# Patient Record
Sex: MALE | URBAN - METROPOLITAN AREA
[De-identification: names, ages, dates, MRNs, and addresses within clinical notes are randomized per-mention and may not be internally consistent; named-entity substitution may affect disease eponyms.]

---

## 2022-08-18 ENCOUNTER — OFFICE VISIT (OUTPATIENT)
Dept: ENT CLINIC | Age: 62
End: 2022-08-18
Payer: COMMERCIAL

## 2022-08-18 VITALS — SYSTOLIC BLOOD PRESSURE: 149 MMHG | DIASTOLIC BLOOD PRESSURE: 100 MMHG | WEIGHT: 201 LBS | HEART RATE: 76 BPM

## 2022-08-18 DIAGNOSIS — H91.93 BILATERAL HEARING LOSS, UNSPECIFIED HEARING LOSS TYPE: Chronic | ICD-10-CM

## 2022-08-18 DIAGNOSIS — S00.33XA CONTUSION OF NOSE, INITIAL ENCOUNTER: Primary | ICD-10-CM

## 2022-08-18 DIAGNOSIS — H93.13 SUBJECTIVE TINNITUS OF BOTH EARS: Chronic | ICD-10-CM

## 2022-08-18 DIAGNOSIS — J30.2 SEASONAL ALLERGIC RHINITIS, UNSPECIFIED TRIGGER: Chronic | ICD-10-CM

## 2022-08-18 PROCEDURE — 99203 OFFICE O/P NEW LOW 30 MIN: CPT | Performed by: OTOLARYNGOLOGY

## 2022-08-18 ASSESSMENT — ENCOUNTER SYMPTOMS
SINUS PAIN: 0
RHINORRHEA: 0
SORE THROAT: 0

## 2022-08-18 NOTE — PROGRESS NOTES
[facility]       NEW PATIENT VISIT      PCP:  MAURICE Van MD      REFERRED BY:   self      CHIEF COMPLAINT  Chief Complaint   Patient presents with    Facial Injury     Nasal injuery last week - C/O pain nose and forehead . Also C/O tinnitus and would like to inquire about getting a hearing eval        HISTORY OF PRESENT ILLNESS       Gurpreet Evans is a 63 y.o. male who presented today for evaluation and management for a nasal injury. \"I collided with a swimmer\" last week 8 days ago, bloody nose while reneqing  certification to practice   No change in shape or nose. Dull headache middle forehead and between the eyes nasal dorsum, since the injury. No difficulty breathing through nose. \"Ringing in my ears\", both ears, for 5-6 years, constant. \"I sleep with a fan, it does drown out the noise. \"  \"My kids tell me I have the TV up loud. \"  Denied any past sinus infection. REVIEW OF SYSTEMS   Review of Systems   Constitutional:  Negative for chills and fever. HENT:  Positive for hearing loss and tinnitus. Negative for ear discharge, ear pain, rhinorrhea, sinus pain and sore throat. PAST MEDICAL HISTORY    History reviewed. No pertinent past medical history. History reviewed. No pertinent surgical history. EXAMINATION    Vitals:    08/18/22 0814   BP: (!) 149/100   Pulse: 76   Weight: 201 lb (91.2 kg)     General:  WDWN, NAD, alert and oriented  Face: There was no swelling or lesions detected. No gross deformity of the facial skeleton, including the zygoma. No step off deformity of the orbital rims. No evidence of nasal, [place], malar-maxillary, orbital, zygomatic, and mandibular fracture. Medial canthal ligaments intact. No diplopia. No black eyes. Voice: Normal with no hoarseness or hot potato voice.     Ears:  TMs and EACs appeared to be normal.    (+) Nose:  Dorsum mildly deflected to the left in the cartilaginous mobile area of nose. Bony dorsum midline and straight. The nasal septum was moderately deviated to the right with deflection of the caudal margin to the left. The inferior nasal turbinates, secretions, and mucosa appeared to be normal.   Sinuses:  Maxillary and frontal sinuses were nontender to palpation and percussion. Oral cavity:  Mucosa, secretions, tongue, and gingiva appeared to be normal.   Oropharynx:  The palatine tonsils, hard and soft palates, uvula, tongue, posterior oropharyngeal wall, mucosa and secretions appeared to be normal.     Salivary Glands:  Normal bilateral parotid and bilateral submandibular salivary glands. Neck:  No masses or tenderness. Trachea midline. Laryngeal cartilages and hyoid bone normal.  Normal laryngeal crepitus. Lymph nodes:  No cervical lymphadenopathy. Britt Sullivan / Rosa Coleman / Minnie Apgar was seen today for facial injury. Diagnoses and all orders for this visit:    Contusion of nose, initial encounter    Subjective tinnitus of both ears  -     Deann Roberts [place], Audiology, [facility]    Bilateral hearing loss, unspecified hearing loss type  -     [address] [place], [place], Audiology, [facility]    Seasonal allergic rhinitis, unspecified trigger       RECOMMENDATIONS/PLAN      Over the counter allergy medications as needed. RTO if not effective. Septal reconstruction, proceed if feels need to improve nasal airway/breathing ability. Acetaminophen (eg. Tylenol) or Ibuprofen (eg. Advil) (over the counter medications) as needed for pain.   Return for recheck/follow-up after hearing test.

## 2022-09-19 ENCOUNTER — PROCEDURE VISIT (OUTPATIENT)
Dept: AUDIOLOGY | Age: 62
End: 2022-09-19
Payer: COMMERCIAL

## 2022-09-19 ENCOUNTER — OFFICE VISIT (OUTPATIENT)
Dept: ENT CLINIC | Age: 62
End: 2022-09-19
Payer: COMMERCIAL

## 2022-09-19 VITALS
DIASTOLIC BLOOD PRESSURE: 94 MMHG | HEIGHT: 69 IN | OXYGEN SATURATION: 97 % | WEIGHT: 197 LBS | HEART RATE: 71 BPM | BODY MASS INDEX: 29.18 KG/M2 | SYSTOLIC BLOOD PRESSURE: 138 MMHG

## 2022-09-19 DIAGNOSIS — H93.13 SUBJECTIVE TINNITUS OF BOTH EARS: Primary | ICD-10-CM

## 2022-09-19 DIAGNOSIS — J34.2 NASAL SEPTAL DEVIATION: Chronic | ICD-10-CM

## 2022-09-19 DIAGNOSIS — J34.3 NASAL TURBINATE HYPERTROPHY: Chronic | ICD-10-CM

## 2022-09-19 DIAGNOSIS — H90.3 SENSORINEURAL HEARING LOSS (SNHL) OF BOTH EARS: ICD-10-CM

## 2022-09-19 DIAGNOSIS — H90.3 BILATERAL SENSORINEURAL HEARING LOSS: Primary | Chronic | ICD-10-CM

## 2022-09-19 PROCEDURE — 92567 TYMPANOMETRY: CPT | Performed by: AUDIOLOGIST

## 2022-09-19 PROCEDURE — 92557 COMPREHENSIVE HEARING TEST: CPT | Performed by: AUDIOLOGIST

## 2022-09-19 PROCEDURE — 99214 OFFICE O/P EST MOD 30 MIN: CPT | Performed by: OTOLARYNGOLOGY

## 2022-09-19 NOTE — PATIENT INSTRUCTIONS
You should return for an annual hearing test to monitor your hearing, and whenever your hearing further decreases significantly. You should return if your ears plug up with ear wax causing difficulty hearing or pressure. You should employ the tinnitus masking techniques and strategies we discussed, as needed. Bedside tinnitus masking devices (eg. a white noise machine, noise machine, noise alexx on your cell phone, fan on in the room, radio with light music or tuned between stations to white noise static) can be very helpful. You should avoid exposure to excessively high levels of noise/sound and use hearing protection measures we discussed, as needed, if such exposure is unavoidable. NO Q-TIPS IN THE EARS  You should never clean your ears with a Q-tip, cotton tipped applicator, Farida pin, paper clip, or any other instrument. I recommend only use of one the several ear wax removal kits available \"over the counter\" (eg. [employer] or Murine, or [employer]) if you feel a need to try to remove ear wax. No other methods should be self used for this purpose as there is danger of injury to the ear and risk of irreparable and irreversible permanent hearing loss.
stated

## 2022-09-19 NOTE — PROGRESS NOTES
[facility]       CHIEF COMPLAINT  Chief Complaint   Patient presents with    Hearing Loss    Tinnitus         HISTORY       Alondra Coffey is a 63 y.o. male here for follow up of hearing loss. He noticed minimal problems hearing and has slight tinnitus at times. He stated that he is breathing okay through both side of his nose. He checked some old photographs and his \"nose was a little crooked\" before the recent injury. He does not think it is changed after the recent injury. EXAMINATION   WDWN, NAD  Nose:  Dorsum deflected to the left in the cartilaginous portion. Bony portion of nose appeared to be midline. NSD to the right moderate with decreased air flow. Left IT hypertrophy. AUDIOGRAM    I independently interpreted the results of the audiogram, a test performed by another healthcare provider, showing bilateral mild symmetrical, pan frequency sensorineural hearing loss. COUNSELING    Audiogram results were reviewed and discussed with Hannah Auguste. I advised of type and severity of hearing loss and the probable etiology of hearing loss of aging (presbycusis). I discussed the possible associated impairment. I advised of the need for avoidance of prolonged or frequent exposure to excessive noise and the need for noise protection, if such exposure is unavoidable. I discussed the possible benefits of hearing aids, or other amplification therapy, if hearing loss worsens. I discussed the possible etiology of tinnitus and treatment/coping measures including masking techniques. I advised of the need for annual and prn hearing tests. Patient was advised of the greater decrease in word and speech understanding in the presence of background noise and of the expected difficulty understanding speech in the presence of moderate to high volume background noise associated with this hearing loss.           IMPRESSION / Vira Enciso / Jessica Monreal / Devante Donald was seen today for hearing loss and tinnitus. Diagnoses and all orders for this visit:    Bilateral sensorineural hearing loss    Nasal septal deviation    Nasal turbinate hypertrophy         RECOMMENDATIONS / PLAN    Septal reconstruction and inferior turbinate reduction. Annual hearing test.    See patient instructions, on file for this visit, which were fully discussed with the patient. Return if symptoms worsen or you decide to proceed with nasal airway surgery, or, for any further ENT or sinus problems or symptoms.

## 2022-09-19 NOTE — PROGRESS NOTES
[facility]    9/19/2022     Patient name: Ganga Mae  Primary Care Physician: Jose Santos MD   Medical Record Number: [id number]     Ganga Mae   1960, 63 y.o. male   [id number]       Referring Provider: Tobi Paniagua MD  Referral Type: In an order in [address] [place] [address]    Reason for Visit: Evaluation of the cause of disorders of hearing, tinnitus, or balance. ADULT AUDIOLOGIC EVALUATION                    Ganga Mae is a 63 y.o. male seen today, 9/19/2022 , for audiologic evaluation. Patient was seen by referring provider Tobi Paniagua MD following today's evaluation. AUDIOLOGIC AND OTHER PERTINENT MEDICAL HISTORY:      Ganga Mae noted high-pitched tinnitus. \"\"Ringing in my ears\", both ears, for 5-6 years, constant. \"I sleep with a fan, it does drown out the noise. \"  \"My kids tell me I have the TV up loud. \" No other otologic factors noted. Tv volume is increased on occasions, has trouble hearing certain family members. IMPRESSIONS:      Results are consistent with bilateral sensorineural hearing loss with excellent word recognition for both ears, and normal middle ear function. Recommended hearing aid evaluation. Patient to follow medical recommendations per  Tobi Paniagua MD    ASSESSMENT AND FINDINGS:     Otoscopy revealed: Visible tympanic membrane bilaterally    Reliability: Good   Transducer: Inserts      RIGHT EAR:  Hearing Sensitivity: Normal to mild sensorineural hearing loss. Speech Recognition Threshold: 20 dB HL  Word Recognition: Excellent (%), based on NU-6   Tympanometry: Normal peak pressure and compliance, Type A tympanogram, consistent with normal middle ear function. Acoustic Reflexes: Ipsilateral: Present at elevated sensation levels and Absent at isolated frequencies. LEFT EAR:  Hearing Sensitivity: Normal to mild sensorineural hearing loss.   Speech Recognition Threshold: 20 dB HL  Word Recognition: Excellent (%), based on NU-6   Tympanometry: Normal peak pressure and compliance, Type A tympanogram, consistent with normal middle ear function. Acoustic Reflexes: Ipsilateral: Present at elevated sensation levels and Absent at isolated frequencies. COUNSELING:      Reviewed purpose of testing completed today, general auditory system function, and results obtained today. The following items are recommended based on patient report and results from today's appointment:   - Continue medical follow-up with Heide Magana MD.   - Retest hearing as medically indicated and/or sooner if a change in hearing is noted. - If desired, schedule a Hearing Aid Evaluation (HAE) appointment to discuss hearing aid options. Chart CC'd to: Heide Magana MD      Degree of   Hearing Sensitivity dB Range   Within Normal Limits (WNL) 0 - 20   Mild 20 - 40   Moderate 40 - 55   Moderately-Severe 55 - 70   Severe 70 - 90   Profound 90 +        RECOMMENDATIONS:        Heide Magana MD to medically advise.     Lucretia Estevez  Audiologist    Electronically signed by Lucretia Estevez on 9/19/22 at 7:59 AM.

## 2022-10-26 ENCOUNTER — OFFICE VISIT (OUTPATIENT)
Dept: ENT CLINIC | Age: 62
End: 2022-10-26
Payer: COMMERCIAL

## 2022-10-26 VITALS — DIASTOLIC BLOOD PRESSURE: 99 MMHG | HEART RATE: 65 BPM | TEMPERATURE: 97.1 F | SYSTOLIC BLOOD PRESSURE: 143 MMHG

## 2022-10-26 DIAGNOSIS — J34.3 NASAL TURBINATE HYPERTROPHY: Chronic | ICD-10-CM

## 2022-10-26 DIAGNOSIS — J34.2 NASAL SEPTAL DEVIATION: Primary | Chronic | ICD-10-CM

## 2022-10-26 DIAGNOSIS — J34.89 NASAL OBSTRUCTION: Chronic | ICD-10-CM

## 2022-10-26 PROCEDURE — 99213 OFFICE O/P EST LOW 20 MIN: CPT | Performed by: OTOLARYNGOLOGY

## 2022-10-26 RX ORDER — MOMETASONE FUROATE 50 UG/1
2 SPRAY, METERED NASAL DAILY
Qty: 17 G | Refills: 1 | Status: SHIPPED | OUTPATIENT
Start: 2022-10-26

## 2022-10-26 RX ORDER — FEXOFENADINE HCL 180 MG/1
180 TABLET ORAL DAILY
Qty: 30 TABLET | Refills: 1 | Status: SHIPPED | OUTPATIENT
Start: 2022-10-26

## 2022-10-26 ASSESSMENT — ENCOUNTER SYMPTOMS
SORE THROAT: 0
RHINORRHEA: 0
SINUS PAIN: 0

## 2022-10-26 NOTE — PROGRESS NOTES
[facility]       PCP:  MAURICE Salas MD      CHIEF COMPLAINT  Chief Complaint   Patient presents with    Nasal deviated septum     Deviated septum, would like it corrected        HISTORY OF PRESENT ILLNESS    Tre Griffin is a 63 y.o. male who presented today for evaluation and management for septal deviation and turbinate enlargement. Difficulty breathing through nose for 5-10 years, right side, never opens up. At night ,often breathing through the left nostril. Single, no one observing sleep. In past has been told he snores. Wakes up in morning feeling unrefreshed often. Does not fall asleep during daytime. Never fallen asleep driving. Had high blood pressure. No witnessed sleep apnea. NKA. Non smoker. Over the years has tried Flonase, Claritin, with no relief. REVIEW OF SYSTEMS   Review of Systems   Constitutional:  Negative for chills and fever. HENT:  Negative for ear discharge, ear pain, rhinorrhea, sinus pain and sore throat. Endocrine: Negative for polyuria. PAST MEDICAL HISTORY    No past medical history on file. No past surgical history on file. EXAMINATION    Vitals:    10/26/22 0903   BP: (!) 143/99   Pulse: 65   Temp: 97.1 °F (36.2 °C)   TempSrc: Temporal     General:  WDWN, NAD, alert and oriented  Face: There was no swelling or lesions detected. Voice: Normal with no hoarseness or hot potato voice. Nose:  There was moderate to severe obstructive rightward NSD causing near total right airway obstruction. There was moderate left IT hypertrophy causing left nasal airway obstruction. The external nose, inferior nasal turbinates, secretions, and mucosa appeared to be normal.   Sinuses:  Maxillary and frontal sinuses were nontender to palpation and percussion.           COUNSELING FOR SEPTAL RECONSTRUCTION AND INFERIOR TURBINATE REDUCTION:  I discussed at length the anatomic obstruction due to nasal septal deviation and turbinate hypertrophy and congestion. I advised that in my opinion, the nasal airway obstruction, and resultant nasal dyspnea, is, in great part, due to these anatomic abnormalities and that surgery has a high likelihood of improving the nasal airway and ameliorating the nasal obstruction and dyspnea. Any problems due to allergic rhinitis will persist, however. Nicolasa Garcia was advised of the recommended surgery/procedure. Nicolasa Garcia stated the desire to proceed with surgery. Nicolasa Garcia chose general anesthesia over local anesthesia with IV sedation. Nicolasa Garcia understands this will be done under general anesthesia. The surgical procedure was described. I discussed the surgical technique and the usual post operative course. I discussed the possibility of persistent or recurrent septal deviation, turbinate hypertrophy and/or nasal airway obstruction and dyspnea. I advised there is no guarantee of cure, success, or of final results. I discussed the alternatives of therapy, expected outcome and potential benefits, and the attendant risks and potential complications, including, but not limited to, excessive intraoperative or postoperative bleeding, hemorrhage, infection, septal perforation (hole in the septum), injury to surrounding structures, excessive scarring, deformity, poor (incomplete or lack of) wound healing, pain, discomfort, numbness of lip, need for further surgery, blindness, loss of vision, meningitis, brain abscess, and risks of anesthesia, including heart attack, cardiac arrest, stroke, blood clots in lower extremity veins, pulmonary embolism, and death, and other risks listed on the informed consent document, which we discussed together. All of Rigoberto's questions were answered. Nicolasa Garcia then stated and confirmed understanding and acceptance of the preceding, having no further questions and the desire to proceed with surgery.   Then, Nicolasa Garcia read and signed the informed consent document, witnessed by the medical assistant. Garth Aldana / Graciela Oconnor / Radha Low was seen today for nasal deviated septum. Diagnoses and all orders for this visit:    Nasal septal deviation  -     mometasone (NASONEX) 50 MCG/ACT nasal spray; 2 sprays by Nasal route daily  -     fexofenadine (ALLEGRA) 180 MG tablet; Take 1 tablet by mouth daily    Nasal turbinate hypertrophy  -     mometasone (NASONEX) 50 MCG/ACT nasal spray; 2 sprays by Nasal route daily  -     fexofenadine (ALLEGRA) 180 MG tablet; Take 1 tablet by mouth daily    Nasal obstruction  -     mometasone (NASONEX) 50 MCG/ACT nasal spray; 2 sprays by Nasal route daily  -     fexofenadine (ALLEGRA) 180 MG tablet; Take 1 tablet by mouth daily         RECOMMENDATIONS/PLAN      Nasonex. Allegra. Septal reconstruction and inferior turbinate reduction. Return for post op check.

## 2022-12-07 RX ORDER — AMLODIPINE BESYLATE 5 MG/1
5 TABLET ORAL DAILY
COMMUNITY

## 2022-12-07 RX ORDER — TADALAFIL 20 MG/1
TABLET ORAL
COMMUNITY
Start: 2021-12-31

## 2022-12-07 NOTE — PROGRESS NOTES
Name_______________________________________Printed:____________________  Date and time of surgery_12/9/22  0830  [facility]______________________Arrival Time:__0700______________   1. The instructions given regarding when and if a patient needs to stop oral intake prior to surgery varies. Follow the specific instructions you were given                  __x_Nothing to eat or to drink after Midnight the night before.                   ____Carbo loading or ERAS instructions will be given to select patients-if you have been given those instructions -please do the following                           The evening before your surgery after dinner before midnight drink 40 ounces of gatorade. If you are diabetic use sugar free. The morning of surgery drink 40 ounces of water. This needs to be finished 3 hours prior to your surgery start time. 2. Take the following pills with a small sip of water on the morning of surgery: amlodipine                  Do not take blood pressure medications ending in pril or sartan the lisette prior to surgery or the morning of surgery. Dr Chanda Interiano patient are not to take any medications the AM of surgery. 3. Aspirin, Ibuprofen, Advil, Naproxen, Vitamin E and other Anti-inflammatory products and supplements should be stopped for 5 -7days before surgery or as directed by your physician. 4. Check with your Doctor regarding stopping Plavix, Coumadin,Eliquis, Lovenox,Effient,Pradaxa,Xarelto, Fragmin or other blood thinners and follow their instructions. 5. Do not smoke, and do not drink any alcoholic beverages 24 hours prior to surgery. This includes NA Beer. Refrain from the usage of any recreational drugs. 6. You may brush your teeth and gargle the morning of surgery. DO NOT SWALLOW WATER   7. You MUST make arrangements for a responsible adult to stay on site while you are here and take you home after your surgery. You will not be allowed to leave alone or drive yourself home.   It is strongly suggested someone stay with you the first 24 hrs. Your surgery will be cancelled if you do not have a ride home. 8. A parent/legal guardian must accompany a child scheduled for surgery and plan to stay at the hospital until the child is discharged. Please do not bring other children with you. 9. Please wear simple, loose fitting clothing to the hospital.  Bhavesh Meggan not bring valuables (money, credit cards, checkbooks, etc.) Do not wear any makeup (including no eye makeup) or nail polish on your fingers or toes. 10. DO NOT wear any jewelry or piercings on day of surgery. All body piercing jewelry must be removed. 11. If you have ___dentures, they will be removed before going to the OR; we will provide you a container. If you wear ___contact lenses or _x__glasses, they will be removed; please bring a case for them. 12. Please see your family doctor/pediatrician for a history & physical and/or concerning medications. Bring any test results/reports from your physician's office. PCP__________________Phone___________H&P Appt. Date________             13 If you  have a Living Will and Durable Power of  for Healthcare, please bring in a copy. 15. Notify your Surgeon if you develop any illness between now and surgery  time, cough, cold, fever, sore throat, nausea, vomiting, etc.  Please notify your surgeon if you experience dizziness, shortness of breath or blurred vision between now & the time of your surgery             15. DO NOT shave your operative site 96 hours prior to surgery. For face & neck surgery, men may use an electric razor 48 hours prior to surgery. 16. Shower the night before or morning of surgery using an antibacterial soap or as you have been instructed. 17. To provide excellent care visitors will be limited to one in the room at any given time. 18.  Please bring picture ID and insurance card. 19.  Visit our web site for additional information:  [email or web address]              20.During flu season no children under the age of 15 are permitted in the hospital for the safety of all patients. 21. If you take a long acting insulin in the evening only  take half of your usual  dose the night  before your procedure              22. If you use a c-pap please bring DOS if staying overnight,             23.For your convenience [id number] [place] [address] has a pharmacy on site to fill your prescriptions. 24. If you use oxygen and have a portable tank please bring it  with you the DOS             25. Bring a complete list of all your medications with name and dose include any supplements. 26. Other__________________________________________   *Please call pre admission testing if you any further questions   Carmen Perez   [address]    [facility] [address]  [phone]   [address] [place] [address]       VISITOR POLICY(subject to change)    Current policy is 2 visitors per patient. No children. Mask is  at the discretion of the facility. Visiting hours are 8a-8p. Overnight visitors will be at the discretion of the nurse. All policies subject to change. All above information reviewed with patient in person or by phone. Patient verbalizes understanding. All questions and concerns addressed.                                                                                                  Patient/Rep_____patient_______________                                                                                                                                    PRE OP INSTRUCTIONS

## 2022-12-09 ENCOUNTER — ANESTHESIA (OUTPATIENT)
Dept: OPERATING ROOM | Age: 62
End: 2022-12-09
Payer: COMMERCIAL

## 2022-12-09 ENCOUNTER — HOSPITAL ENCOUNTER (OUTPATIENT)
Age: 62
Setting detail: OUTPATIENT SURGERY
Discharge: HOME | End: 2022-12-09
Attending: OTOLARYNGOLOGY | Admitting: OTOLARYNGOLOGY
Payer: COMMERCIAL

## 2022-12-09 ENCOUNTER — ANESTHESIA EVENT (OUTPATIENT)
Dept: OPERATING ROOM | Age: 62
End: 2022-12-09
Payer: COMMERCIAL

## 2022-12-09 VITALS
OXYGEN SATURATION: 96 % | HEIGHT: 69 IN | SYSTOLIC BLOOD PRESSURE: 148 MMHG | WEIGHT: 201 LBS | DIASTOLIC BLOOD PRESSURE: 91 MMHG | BODY MASS INDEX: 29.77 KG/M2 | TEMPERATURE: 98 F | RESPIRATION RATE: 16 BRPM | HEART RATE: 78 BPM

## 2022-12-09 DIAGNOSIS — J34.89 NASAL OBSTRUCTION: ICD-10-CM

## 2022-12-09 DIAGNOSIS — G89.18 POSTOPERATIVE PAIN: ICD-10-CM

## 2022-12-09 DIAGNOSIS — J34.2 DEVIATED NASAL SEPTUM: Primary | ICD-10-CM

## 2022-12-09 DIAGNOSIS — J34.3 NASAL TURBINATE HYPERTROPHY: ICD-10-CM

## 2022-12-09 PROCEDURE — 3700000000 HC ANESTHESIA ATTENDED CARE: Performed by: OTOLARYNGOLOGY

## 2022-12-09 PROCEDURE — 6360000002 HC RX W HCPCS: Performed by: REGISTERED NURSE

## 2022-12-09 PROCEDURE — 3700000001 HC ADD 15 MINUTES (ANESTHESIA): Performed by: OTOLARYNGOLOGY

## 2022-12-09 PROCEDURE — 30930 THER FX NASAL INF TURBINATE: CPT | Performed by: OTOLARYNGOLOGY

## 2022-12-09 PROCEDURE — 2500000003 HC RX 250 WO HCPCS: Performed by: OTOLARYNGOLOGY

## 2022-12-09 PROCEDURE — 3600000004 HC SURGERY LEVEL 4 BASE: Performed by: OTOLARYNGOLOGY

## 2022-12-09 PROCEDURE — 7100000000 HC PACU RECOVERY - FIRST 15 MIN: Performed by: OTOLARYNGOLOGY

## 2022-12-09 PROCEDURE — 2709999900 HC NON-CHARGEABLE SUPPLY: Performed by: OTOLARYNGOLOGY

## 2022-12-09 PROCEDURE — 6360000002 HC RX W HCPCS: Performed by: OTOLARYNGOLOGY

## 2022-12-09 PROCEDURE — 7100000010 HC PHASE II RECOVERY - FIRST 15 MIN: Performed by: OTOLARYNGOLOGY

## 2022-12-09 PROCEDURE — 6370000000 HC RX 637 (ALT 250 FOR IP): Performed by: ANESTHESIOLOGY

## 2022-12-09 PROCEDURE — 2580000003 HC RX 258: Performed by: REGISTERED NURSE

## 2022-12-09 PROCEDURE — 2580000003 HC RX 258: Performed by: OTOLARYNGOLOGY

## 2022-12-09 PROCEDURE — 7100000011 HC PHASE II RECOVERY - ADDTL 15 MIN: Performed by: OTOLARYNGOLOGY

## 2022-12-09 PROCEDURE — 3600000014 HC SURGERY LEVEL 4 ADDTL 15MIN: Performed by: OTOLARYNGOLOGY

## 2022-12-09 PROCEDURE — 6370000000 HC RX 637 (ALT 250 FOR IP): Performed by: OTOLARYNGOLOGY

## 2022-12-09 PROCEDURE — 2500000003 HC RX 250 WO HCPCS: Performed by: REGISTERED NURSE

## 2022-12-09 PROCEDURE — 2720000010 HC SURG SUPPLY STERILE: Performed by: OTOLARYNGOLOGY

## 2022-12-09 PROCEDURE — 30802 ABLATE INF TURBINATE SUBMUC: CPT | Performed by: OTOLARYNGOLOGY

## 2022-12-09 PROCEDURE — 30520 REPAIR OF NASAL SEPTUM: CPT | Performed by: OTOLARYNGOLOGY

## 2022-12-09 PROCEDURE — 7100000001 HC PACU RECOVERY - ADDTL 15 MIN: Performed by: OTOLARYNGOLOGY

## 2022-12-09 RX ORDER — SODIUM CHLORIDE 9 MG/ML
INJECTION, SOLUTION INTRAVENOUS CONTINUOUS PRN
Status: DISCONTINUED | OUTPATIENT
Start: 2022-12-09 | End: 2022-12-09 | Stop reason: SDUPTHER

## 2022-12-09 RX ORDER — ONDANSETRON 2 MG/ML
INJECTION INTRAMUSCULAR; INTRAVENOUS PRN
Status: DISCONTINUED | OUTPATIENT
Start: 2022-12-09 | End: 2022-12-09 | Stop reason: SDUPTHER

## 2022-12-09 RX ORDER — PROPOFOL 10 MG/ML
INJECTION, EMULSION INTRAVENOUS PRN
Status: DISCONTINUED | OUTPATIENT
Start: 2022-12-09 | End: 2022-12-09 | Stop reason: SDUPTHER

## 2022-12-09 RX ORDER — ACETAMINOPHEN 325 MG/1
650 TABLET ORAL ONCE
Status: COMPLETED | OUTPATIENT
Start: 2022-12-09 | End: 2022-12-09

## 2022-12-09 RX ORDER — SODIUM CHLORIDE 9 MG/ML
INJECTION, SOLUTION INTRAVENOUS CONTINUOUS
Status: CANCELLED | OUTPATIENT
Start: 2022-12-09

## 2022-12-09 RX ORDER — HYDROMORPHONE HCL 110MG/55ML
0.25 PATIENT CONTROLLED ANALGESIA SYRINGE INTRAVENOUS EVERY 5 MIN PRN
Status: CANCELLED | OUTPATIENT
Start: 2022-12-09

## 2022-12-09 RX ORDER — HYDROMORPHONE HCL 110MG/55ML
0.5 PATIENT CONTROLLED ANALGESIA SYRINGE INTRAVENOUS EVERY 5 MIN PRN
Status: CANCELLED | OUTPATIENT
Start: 2022-12-09

## 2022-12-09 RX ORDER — LABETALOL HYDROCHLORIDE 5 MG/ML
5 INJECTION, SOLUTION INTRAVENOUS
Status: CANCELLED | OUTPATIENT
Start: 2022-12-09

## 2022-12-09 RX ORDER — OXYCODONE HYDROCHLORIDE 5 MG/1
5 TABLET ORAL
Status: CANCELLED | OUTPATIENT
Start: 2022-12-09 | End: 2022-12-10

## 2022-12-09 RX ORDER — HYDROCODONE BITARTRATE AND ACETAMINOPHEN 7.5; 325 MG/1; MG/1
1 TABLET ORAL EVERY 6 HOURS PRN
Qty: 24 TABLET | Refills: 0 | Status: SHIPPED | OUTPATIENT
Start: 2022-12-09 | End: 2022-12-15

## 2022-12-09 RX ORDER — SODIUM CHLORIDE 9 MG/ML
INJECTION, SOLUTION INTRAVENOUS CONTINUOUS
Status: DISCONTINUED | OUTPATIENT
Start: 2022-12-09 | End: 2022-12-09 | Stop reason: HOSPADM

## 2022-12-09 RX ORDER — GLYCOPYRROLATE 0.2 MG/ML
INJECTION INTRAMUSCULAR; INTRAVENOUS PRN
Status: DISCONTINUED | OUTPATIENT
Start: 2022-12-09 | End: 2022-12-09 | Stop reason: SDUPTHER

## 2022-12-09 RX ORDER — FENTANYL CITRATE 50 UG/ML
INJECTION, SOLUTION INTRAMUSCULAR; INTRAVENOUS PRN
Status: DISCONTINUED | OUTPATIENT
Start: 2022-12-09 | End: 2022-12-09 | Stop reason: SDUPTHER

## 2022-12-09 RX ORDER — ROCURONIUM BROMIDE 10 MG/ML
INJECTION, SOLUTION INTRAVENOUS PRN
Status: DISCONTINUED | OUTPATIENT
Start: 2022-12-09 | End: 2022-12-09 | Stop reason: SDUPTHER

## 2022-12-09 RX ORDER — SUCCINYLCHOLINE/SOD CL,ISO/PF 200MG/10ML
SYRINGE (ML) INTRAVENOUS PRN
Status: DISCONTINUED | OUTPATIENT
Start: 2022-12-09 | End: 2022-12-09 | Stop reason: SDUPTHER

## 2022-12-09 RX ORDER — MIDAZOLAM HYDROCHLORIDE 1 MG/ML
INJECTION INTRAMUSCULAR; INTRAVENOUS PRN
Status: DISCONTINUED | OUTPATIENT
Start: 2022-12-09 | End: 2022-12-09 | Stop reason: SDUPTHER

## 2022-12-09 RX ORDER — MAGNESIUM SULFATE HEPTAHYDRATE 500 MG/ML
INJECTION, SOLUTION INTRAMUSCULAR; INTRAVENOUS PRN
Status: DISCONTINUED | OUTPATIENT
Start: 2022-12-09 | End: 2022-12-09 | Stop reason: SDUPTHER

## 2022-12-09 RX ORDER — SODIUM CHLORIDE 9 MG/ML
INJECTION, SOLUTION INTRAVENOUS CONTINUOUS
Status: DISCONTINUED | OUTPATIENT
Start: 2022-12-09 | End: 2022-12-09

## 2022-12-09 RX ORDER — KETAMINE HCL IN NACL, ISO-OSM 100MG/10ML
SYRINGE (ML) INJECTION PRN
Status: DISCONTINUED | OUTPATIENT
Start: 2022-12-09 | End: 2022-12-09 | Stop reason: SDUPTHER

## 2022-12-09 RX ORDER — LIDOCAINE HYDROCHLORIDE AND EPINEPHRINE 10; 10 MG/ML; UG/ML
INJECTION, SOLUTION INFILTRATION; PERINEURAL
Status: COMPLETED | OUTPATIENT
Start: 2022-12-09 | End: 2022-12-09

## 2022-12-09 RX ORDER — BACITRACIN ZINC AND POLYMYXIN B SULFATE 500; 1000 [USP'U]/G; [USP'U]/G
OINTMENT TOPICAL
Status: COMPLETED | OUTPATIENT
Start: 2022-12-09 | End: 2022-12-09

## 2022-12-09 RX ORDER — DEXAMETHASONE SODIUM PHOSPHATE 4 MG/ML
INJECTION, SOLUTION INTRA-ARTICULAR; INTRALESIONAL; INTRAMUSCULAR; INTRAVENOUS; SOFT TISSUE PRN
Status: DISCONTINUED | OUTPATIENT
Start: 2022-12-09 | End: 2022-12-09 | Stop reason: SDUPTHER

## 2022-12-09 RX ORDER — ONDANSETRON 2 MG/ML
4 INJECTION INTRAMUSCULAR; INTRAVENOUS
Status: CANCELLED | OUTPATIENT
Start: 2022-12-09 | End: 2022-12-10

## 2022-12-09 RX ORDER — LIDOCAINE HYDROCHLORIDE 20 MG/ML
INJECTION, SOLUTION EPIDURAL; INFILTRATION; INTRACAUDAL; PERINEURAL PRN
Status: DISCONTINUED | OUTPATIENT
Start: 2022-12-09 | End: 2022-12-09 | Stop reason: SDUPTHER

## 2022-12-09 RX ORDER — PROMETHAZINE HYDROCHLORIDE 25 MG/1
25 TABLET ORAL EVERY 6 HOURS PRN
Qty: 40 TABLET | Refills: 0 | Status: SHIPPED | OUTPATIENT
Start: 2022-12-09

## 2022-12-09 RX ORDER — PHENYLEPHRINE HCL IN 0.9% NACL 1 MG/10 ML
SYRINGE (ML) INTRAVENOUS PRN
Status: DISCONTINUED | OUTPATIENT
Start: 2022-12-09 | End: 2022-12-09 | Stop reason: SDUPTHER

## 2022-12-09 RX ORDER — LIDOCAINE HYDROCHLORIDE 10 MG/ML
1 INJECTION, SOLUTION EPIDURAL; INFILTRATION; INTRACAUDAL; PERINEURAL
Status: DISCONTINUED | OUTPATIENT
Start: 2022-12-09 | End: 2022-12-09 | Stop reason: HOSPADM

## 2022-12-09 RX ORDER — CEFUROXIME AXETIL 250 MG/1
250 TABLET ORAL 2 TIMES DAILY
Qty: 14 TABLET | Refills: 0 | Status: SHIPPED | OUTPATIENT
Start: 2022-12-09 | End: 2022-12-16

## 2022-12-09 RX ADMIN — MAGNESIUM SULFATE HEPTAHYDRATE 1 G: 500 INJECTION, SOLUTION INTRAMUSCULAR; INTRAVENOUS at 08:43

## 2022-12-09 RX ADMIN — DEXAMETHASONE SODIUM PHOSPHATE 8 MG: 4 INJECTION, SOLUTION INTRAMUSCULAR; INTRAVENOUS at 08:43

## 2022-12-09 RX ADMIN — ACETAMINOPHEN 650 MG: 325 TABLET ORAL at 07:55

## 2022-12-09 RX ADMIN — GLYCOPYRROLATE 0.2 MG: 0.2 INJECTION, SOLUTION INTRAMUSCULAR; INTRAVENOUS at 08:45

## 2022-12-09 RX ADMIN — CEFAZOLIN 2000 MG: 2 INJECTION, POWDER, FOR SOLUTION INTRAMUSCULAR; INTRAVENOUS at 08:27

## 2022-12-09 RX ADMIN — Medication 200 MCG: at 08:53

## 2022-12-09 RX ADMIN — FENTANYL CITRATE 25 MCG: 50 INJECTION, SOLUTION INTRAMUSCULAR; INTRAVENOUS at 09:44

## 2022-12-09 RX ADMIN — Medication 100 MCG: at 08:45

## 2022-12-09 RX ADMIN — PROPOFOL 200 MG: 10 INJECTION, EMULSION INTRAVENOUS at 08:36

## 2022-12-09 RX ADMIN — FENTANYL CITRATE 50 MCG: 50 INJECTION, SOLUTION INTRAMUSCULAR; INTRAVENOUS at 08:36

## 2022-12-09 RX ADMIN — MIDAZOLAM 2 MG: 1 INJECTION INTRAMUSCULAR; INTRAVENOUS at 08:29

## 2022-12-09 RX ADMIN — Medication 140 MG: at 08:36

## 2022-12-09 RX ADMIN — Medication 100 MCG: at 09:05

## 2022-12-09 RX ADMIN — Medication 10 MG: at 09:26

## 2022-12-09 RX ADMIN — LIDOCAINE HYDROCHLORIDE 100 MG: 20 INJECTION, SOLUTION EPIDURAL; INFILTRATION; INTRACAUDAL; PERINEURAL at 08:36

## 2022-12-09 RX ADMIN — ROCURONIUM BROMIDE 50 MG: 10 INJECTION, SOLUTION INTRAVENOUS at 08:43

## 2022-12-09 RX ADMIN — Medication 40 MG: at 08:43

## 2022-12-09 RX ADMIN — SODIUM CHLORIDE: 9 INJECTION, SOLUTION INTRAVENOUS at 08:30

## 2022-12-09 RX ADMIN — SODIUM CHLORIDE: 9 INJECTION, SOLUTION INTRAVENOUS at 07:41

## 2022-12-09 RX ADMIN — ONDANSETRON 4 MG: 2 INJECTION INTRAMUSCULAR; INTRAVENOUS at 08:43

## 2022-12-09 RX ADMIN — SODIUM CHLORIDE: 9 INJECTION, SOLUTION INTRAVENOUS at 09:20

## 2022-12-09 RX ADMIN — FENTANYL CITRATE 25 MCG: 50 INJECTION, SOLUTION INTRAMUSCULAR; INTRAVENOUS at 09:56

## 2022-12-09 ASSESSMENT — PAIN SCALES - GENERAL
PAINLEVEL_OUTOF10: 0
PAINLEVEL_OUTOF10: 0
PAINLEVEL_OUTOF10: 2

## 2022-12-09 ASSESSMENT — PAIN DESCRIPTION - LOCATION: LOCATION: FACE

## 2022-12-09 ASSESSMENT — PAIN - FUNCTIONAL ASSESSMENT: PAIN_FUNCTIONAL_ASSESSMENT: 0-10

## 2022-12-09 NOTE — PROGRESS NOTES
Waking up more. Taking ice chips without nausea. No drainage/ bleeding from nasal splints. Nasal dressing in place  Meets criteria to be discharged from phase 1 of recovery care.  Will plan on discharge in phase 2

## 2022-12-09 NOTE — ANESTHESIA POSTPROCEDURE EVALUATION
Department of Anesthesiology  Postprocedure Note    Patient: Hollie Reno  MRN: [id number]  YOB: 1960  Date of evaluation: 12/9/2022      Procedure Summary     Date: 12/09/22 Room / Location: [address]    Anesthesia Start: 0830 Anesthesia Stop: 1004    Procedure: SEPTAL RECONSTRUCTION; INFERIOR TURBINATE REDUCTION (Nose) Diagnosis:       Deviated nasal septum      Nasal turbinate hypertrophy      Nasal obstruction      (Deviated nasal septum [J34.2])      (Nasal turbinate hypertrophy [J34.3])      (Nasal obstruction [J34.89])    Surgeons: Iain [place] MD Responsible Provider: Tony Austin MD    Anesthesia Type: general ASA Status: 2          Anesthesia Type: No value filed.     Shane Phase I: Shane Score: 10    Shane Phase II:        Anesthesia Post Evaluation    Patient location during evaluation: PACU  Airway patency: patent  Nausea & Vomiting: no vomiting  Complications: no  Cardiovascular status: hemodynamically stable  Respiratory status: acceptable  Hydration status: euvolemic  Multimodal analgesia pain management approach

## 2022-12-09 NOTE — OP NOTE
Operative Note      Patient: Chilango Martinez  YOB: 1960  MRN: [id number]    Date of Procedure: 12/9/2022    Pre-Op Diagnosis: Deviated nasal septum [J34.2]  Nasal turbinate hypertrophy [J34.3]  Nasal obstruction [J34.89]    Post-Op Diagnosis: Same         Procedure(s):  SEPTAL RECONSTRUCTION     BIPOLAR INTRAMURAL CAUTERIZATION OF BILATERAL INFERIOR TURBINATES    OUTFRACTURE OF BILATERAL INFERIOR NASAL TURBINATES       Surgeon(s):  Roberto Snider MD    Assistant:   * No surgical staff found *    Anesthesia: General    Estimated Blood Loss (mL): less than 50 (fifty)    Complications: None    Specimens:   * No specimens in log *    Implants:  * No implants in log *      Drains: * No LDAs found *    Findings: The nasal septum was moderately severely deviated to the right in the mid septal area causing 90% right nasal airway obstruction. There was a right inferior spur and a left inferior spur. There was anterior septal deviation to the left causing approximately 50% left nasal airway obstruction the left inferior turbinate was hypertrophied and medially malpositioned causing left nasal airway obstruction. The right inferior nasal turbinate was medially malpositioned and mildly hypertrophied contributing to right nasal airway obstruction. There was bilateral posterior bony septal spurs and deviation of the bony septum to the left. All of these anatomic abnormalities were causing 90% right nasal airway obstruction and approximately 50% left nasal airway obstruction. Detailed Description of Procedure: The patient was taken to the operating room and placed in the supine position. Adequate and uncomplicated general anesthesia  was induced and maintained by the attending anesthetist using oral endotracheal tube technique. The attending anesthetist monitored the patient throughout the case. A timeout was held and the patient was identified and the procedure confirmed.   The right and left nasal cavities were then packed with 3 surgical cottonoids moistened with 0.05% oxymetazoline nasal solution bilaterally. After 10 minutes by the clock, these were deflected laterally and the anterior nasal septum infiltrated with 5 ml of 1% lidocaine with 1:100,000 epinephrine on each side in the usual manner. The cottonoids were then left in place for an additional 5 minutes for additional vasoconstrictive effect and then removed, confirming the count correct. The patient was draped in a sterile manner. A hemitransfixion incision was made on the left and carried down to the submucoperichondrial plane. A mucoperichondrium/mucoperiosteal septal flap was elevated on the left side. A cartilage incision was made parallel to and posterior to the caudal strut, preserving the caudal strut. A short horizontal cut was made and the cartilage at the superior extent of the cartilage incision. Mucoperichondrium was then elevated from the right side of the cartilaginous and bony ends of the nasal septum. The central deformed quadrilateral cartilage was then excised using the Park swivel knife. This was a small area of cartilage due to the anterior location of the cartilaginous bony junction. The deformed and deviated and thickened portions of the bony portion of the nasal septum were now removed with the Roberts-Hutchinson opening forceps and Holly forceps. Mucoperichondrium and mucoperiosteum was then elevated from both sides of the maxillary crest and vomer. The deviated and deformed portions of the maxillary crest and vomer were then removed with a 4 mm chisel and mallet, effectively removing the bony contributions to the inferior septal spurs. The caudal and the dorsal struts were carefully preserved. The mucoperichondrial flaps were returned into position and the septum stood in the midline with no significant lateral deviations or spurs.   The hemitransfixion incision was approximated with 2 interrupted inverted buried sutures of 4-0 chromic. The right and left inferior turbinates were subjected to bipolar intramural cauterization with the Dhiraj bipolar electroturbinate probe in the usual manner. The inferior nasal turbinates were in-fractured followed by out-fracturing using the BoDindong nasal elevator to further improve nasal airway bilaterally. The Reltok septal splints were placed bilaterally along the septum. These were secured anteriorly with a through and through suture using the 4-0 chromic after straightening the needle. The Kotler airways were then placed bilaterally and manipulated into proper position. The [place] Merocel Slimline nasal sponge packs were coated with Polysporin ointment and then inserted bilaterally with the nasal speculum protecting the septal flaps. These were placed above the Kotler airways in usual manner. The [place] Merocel packs were then hydrated with sterile saline. Suture tails were taped to the nasal dorsum with paper tape over Mastisol solution. The Kotler airways were irrigated sterile saline and then suctioned using the accompanying suction catheter. A mustache dressing was placed. The patient appeared to have tolerated this procedure well with no intraoperative complications and minimal blood loss less than 50 mL. The apparently stable patient was returned to the care of the attending anesthetist for awakening, extubation, and transport to the PACU.        Electronically signed by Michelle Leon MD on 12/9/2022 at 10:04 AM

## 2022-12-09 NOTE — PROGRESS NOTES
All discharge information explained to patient and responsible party ( kal Duarte) to include home medications, pain management, diet, activity (including fall risk), wound care. (Nasal splints/ mustache dressing)    Patient/ responsible party voiced clear understanding of discharge instructions.  Papers signed and copies given

## 2022-12-09 NOTE — H&P
Date of Surgery Update:  Ranjit Lomeli was seen, history and physical examination reviewed, and patient examined by me today. There have been no significant clinical changes since the completion of the previous history and physical.    The risk, benefits, and alternatives of the proposed procedure have been explained to the patient (or appropriate guardian) and understanding verbalized. All questions answered. Patient wishes to proceed.     Electronically signed by: Eze Will MD,12/9/2022,8:27 AM
The patient is a 74y Female complaining of medical evaluation.

## 2022-12-09 NOTE — DISCHARGE INSTRUCTIONS
[facility]    Komal Larios. Media Gaucher, M.D.  [address] [place] [address] [facility]  ([phone]) [address] [facility] should be up and walking and moving about beginning the evening of surgery. Exercise caution due to the sedative effects (drowsiness, sleepiness) of your medications. You may resume normal nonstrenuous activity upon arrival home. When you rest in bed, elevate your head with your face up, on the day of surgery and the first day after surgery. Elevate your head at all times, using two or three pillows when lying down, for the first week after surgery. Please refrain from any strenuous physical activity or exercise for two weeks after surgery. DRAINING AND BLEEDING are normal after nasal surgery. Change your mustache dressing under the nose as it becomes soiled or soaked. Continue using a mustache dressing under the nose until nasal bleeding and/or drainage cease. If bleeding occurs, lie with the head elevated on two pillows. Put crushed ice in a plastic sandwich bag wrapped in cloth, such as a cotton lilian-shirt, and place on the forehead. SWELLING of the upper lip and face are common after surgery. This will subside after a few days. KOTLER NASAL AIRWAY / NASAL PACKING  Nasal sponge packing, septal splints, and Kotler nasal airways were used. This nasal airways may allow near normal breathing through the nose. They will also act to vent and relieve pressure when swallowing. You may be able to breathe through your nose via the tubes. Watery eyes and nasal discomfort will subside once the packing, splints, and airways are removed by the doctor. DO NOT REMOVE THE PACKING YOURSELF. The nasal tubes you should be irrigated using the syringe and adapter in the packet you were provided after surgery.   Irrigate each tube with a solution of 2 cups distilled water + 1 teaspoon of salt + 1 tsp of baking soda. Alternatively, you may use normal saline irrigation solution if you have that available. Do not blow out through your nose because this will force mucus into the tubes, which will harden and plug the tubes. If that occurs, you will have to breathe exclusively through your mouth and wait until the tubes and packing are removed. After the are removed, spray/mist both sides of your nose gently with saline nasal spray (e.g. Ocean, AYR) every two  hours while awake for the first week and thereafter four times daily and as needed for dryness or crusting. NOSE BLOWING  After the splints, airways, and packing are removed you may gently blow your nose, but do not move your nose to either side. Take only those medications prescribed by Doctor Devante Plummer, and your usual prescribed medications, if approved by Doctor Devante Plummer. QUESTIONS? If you have any questions or have any problems after surgery, please call the office at [phone]. ANESTHESIA DISCHARGE INSTRUCTIONS    Wear your seatbelt home. You are under the influence of drugs-do not drink alcohol, drive, operate machinery, make any important decisions or sign any legal documents for 24 hours. Children should not ride bikes, [place] or play on gym sets for 24 hours after surgery. A responsible adult needs to be with you for 24 hours. You may experience lightheadedness, dizziness, or sleepiness following surgery. Rest at home today- increase activity as tolerated. Progress slowly to a regular diet unless your physician has instructed you otherwise. Drink plenty of water. If persistent nausea and vomiting becomes a problem, call your physician. Coughing, sore throat and muscle aches are other side effects of anesthesia, and should improve with time. Do not drive or operate machinery while taking narcotics.   Females of childbearing potential and on hormonal birth control, should use two forms of contraception following procedure if given a medication called sugammadex and/or emend. Additional contraception should be used for 7 days for sugammadex and/or 28 days for emend. These medications have a potential to reduce the effectiveness of hormonal birth control.

## 2022-12-09 NOTE — BRIEF OP NOTE
Brief Postoperative Note      Patient: Romario Bettencourt  YOB: 1960  MRN: [id number]    Date of Procedure: 12/9/2022    Pre-Op Diagnosis:   Deviated nasal septum [J34.2]  Nasal turbinate hypertrophy [J34.3]  Nasal obstruction [J34.89]    Post-Op Diagnosis: Same       Procedure(s):  SEPTAL RECONSTRUCTION     BIPOLAR INTRAMURAL CAUTERIZATION OF BILATERAL INFERIOR TURBINATES    OUTFRACTURE OF BILATERAL INFERIOR NASAL TURBINATES       Surgeon(s):  Neal Valdivia MD    Assistant:  * No surgical staff found *    Anesthesia: General    Estimated Blood Loss (mL): less than 50 (fifty)    Complications: None    Specimens:   * No specimens in log *    Implants:  * No implants in log *      Drains: * No LDAs found *    Findings: The nasal septum was moderately severely deviated to the right in the mid septal area causing 90% right nasal airway obstruction. There was a right inferior spur and a left inferior spur. There was anterior septal deviation to the left causing approximately 50% left nasal airway obstruction the left inferior turbinate was hypertrophied and medially malpositioned causing left nasal airway obstruction. There was bilateral posterior bony septal spurs and deviation of the bony septum to the left. All of these anatomic abnormalities were causing 90% right nasal airway obstruction and approximately 50% left nasal airway obstruction.       Electronically signed by Neal Valdivia MD on 12/9/2022 at 9:53 AM

## 2022-12-09 NOTE — PROGRESS NOTES
Arrived into PACU following surgical procedure (septal reconstruction inferior turbinate reduction by Dr Destin Black) Report received from Bev Garber and CRNA. Oral airway removed by CRNA upon arrival. Simple mask at 6L. Pt asleep. Does not respond to name. Vss.  Will continue to monitor

## 2022-12-09 NOTE — ANESTHESIA PRE PROCEDURE
Department of Anesthesiology  Preprocedure Note       Name:  Katelynn Ambrosio   Age:  63 y.o.  :  1960                                          MRN:  [id number]         Date:  2022      Surgeon: Skyla Devine):  Juliet Piedra MD    Procedure: Procedure(s):  SEPTAL RECONSTRUCTION; INFERIOR TURBINATE REDUCTION    Medications prior to admission:   Prior to Admission medications    Medication Sig Start Date End Date Taking? Authorizing Provider   amLODIPine (NORVASC) 5 MG tablet Take 5 mg by mouth daily   Yes Historical Provider, MD   Tadalafil, PAH, 20 MG tablet  21  Yes Historical Provider, MD   mometasone (NASONEX) 50 MCG/ACT nasal spray 2 sprays by Nasal route daily  Patient not taking: Reported on 2022 10/26/22   Julite Piedra MD   fexofenadine (ALLEGRA) 180 MG tablet Take 1 tablet by mouth daily  Patient not taking: Reported on 2022 10/26/22   Juliet Piedra MD       Current medications:    Current Facility-Administered Medications   Medication Dose Route Frequency Provider Last Rate Last Admin    0.9 % sodium chloride infusion   IntraVENous Continuous Juliet Piedra  mL/hr at 22 0741 New Bag at 22 0741    ceFAZolin (ANCEF) 2,000 mg in dextrose 5 % 50 mL IVPB (mini-bag)  2,000 mg IntraVENous Once Juliet Piedra MD        lidocaine PF 1 % injection 1 mL  1 mL IntraDERmal Once PRN Barry Snyder MD        acetaminophen (TYLENOL) tablet 650 mg  650 mg Oral Once Barry Snyder MD           Allergies:  No Known Allergies    Problem List:  There is no problem list on file for this patient. Past Medical History:        Diagnosis Date    Hypertension        Past Surgical History:  History reviewed. No pertinent surgical history.     Social History:    Social History     Tobacco Use    Smoking status: Never    Smokeless tobacco: Never   Substance Use Topics    Alcohol use: Never                                Counseling given: Not Answered      Vital Signs (Current):   Vitals:    12/07/22 1419 12/09/22 0745   BP:  (!) 125/96   Pulse:  66   Resp:  16   Temp:  97.3 °F (36.3 °C)   TempSrc:  Temporal   SpO2:  99%   Weight: 200 lb (90.7 kg) 201 lb (91.2 kg)   Height: 5' 9\" (1.753 m) 5' 9\" (1.753 m)                                              BP Readings from Last 3 Encounters:   12/09/22 (!) 125/96   10/26/22 (!) 143/99   09/19/22 (!) 138/94       NPO Status: Time of last liquid consumption: 2100                        Time of last solid consumption: 2100                        Date of last liquid consumption: 12/08/22                        Date of last solid food consumption: 12/08/22    BMI:   Wt Readings from Last 3 Encounters:   12/09/22 201 lb (91.2 kg)   09/19/22 197 lb (89.4 kg)   08/18/22 201 lb (91.2 kg)     Body mass index is 29.68 kg/m². CBC: No results found for: WBC, RBC, HGB, HCT, MCV, RDW, PLT    CMP: No results found for: NA, K, CL, CO2, BUN, CREATININE, GFRAA, AGRATIO, LABGLOM, GLUCOSE, GLU, PROT, CALCIUM, BILITOT, ALKPHOS, AST, ALT    POC Tests: No results for input(s): POCGLU, POCNA, POCK, POCCL, POCBUN, POCHEMO, POCHCT in the last 72 hours.     Coags: No results found for: PROTIME, INR, APTT    HCG (If Applicable): No results found for: PREGTESTUR, PREGSERUM, HCG, HCGQUANT     ABGs: No results found for: PHART, PO2ART, TQJ4LAP, AYI4JDV, BEART, E1SFUINY     Type & Screen (If Applicable):  No results found for: LABABO, LABRH    Drug/Infectious Status (If Applicable):  No results found for: HIV, HEPCAB    COVID-19 Screening (If Applicable): No results found for: COVID19        Anesthesia Evaluation  Patient summary reviewed and Nursing notes reviewed no history of anesthetic complications:   Airway: Mallampati: II  TM distance: >3 FB   Neck ROM: full  Mouth opening: > = 3 FB   Dental: normal exam         Pulmonary: breath sounds clear to auscultation      (-) COPD and asthma                           Cardiovascular:  Exercise tolerance: good (>4 METS), (+) hypertension:,     (-) dysrhythmias,  angina and  CHF      Rhythm: regular  Rate: normal                    Neuro/Psych:      (-) seizures, TIA and CVA           GI/Hepatic/Renal:        (-) GERD       Endo/Other:        (-) hypothyroidism, hyperthyroidism                ROS comment: No known FH of problems with GA Abdominal:             Vascular:     - DVT and PE. Other Findings:           Anesthesia Plan      general     ASA 2       Induction: intravenous. MIPS: Postoperative opioids intended and Prophylactic antiemetics administered. Anesthetic plan and risks discussed with patient. Plan discussed with CRNA.                     Holly Gutierres MD   12/9/2022

## 2022-12-12 ENCOUNTER — OFFICE VISIT (OUTPATIENT)
Dept: ENT CLINIC | Age: 62
End: 2022-12-12

## 2022-12-12 VITALS
HEART RATE: 76 BPM | SYSTOLIC BLOOD PRESSURE: 138 MMHG | DIASTOLIC BLOOD PRESSURE: 96 MMHG | TEMPERATURE: 97.3 F | HEIGHT: 69 IN | BODY MASS INDEX: 29.92 KG/M2 | WEIGHT: 202 LBS

## 2022-12-12 DIAGNOSIS — Z09 POSTOP CHECK: Primary | ICD-10-CM

## 2022-12-12 PROCEDURE — 99024 POSTOP FOLLOW-UP VISIT: CPT | Performed by: OTOLARYNGOLOGY

## 2022-12-12 NOTE — PROGRESS NOTES
POST-OP NOTE    Chief Complaint   Patient presents with    Post-Op Check       HISTORY:     POD #3, Post septal reconstruction and inferior turbinate reduction on 12/09/2022. Doing well, with no current complaints. EXAMINATION:        Vitals:    12/12/22 0852   BP: (!) 138/96   Pulse: 76   Temp: 97.3 °F (36.3 °C)      WDWN, awake and alert, with no evidence of distress. Nose:  Kotler airways, splints and packing removed with no difficulty and no bleeding. Airway widely patent with normal excellent air flow. Kathy Forte was seen today for post-op check. Diagnoses and all orders for this visit:    Postop check         RECOMMENDATIONS / PLAN:    See Patient Instructions on file for this visit.      Return in 9 days (on 12/21/2022) for post op check; please be in the office at 2 PM.

## 2022-12-12 NOTE — PATIENT INSTRUCTIONS
Follow post op instructions in the after visit summary you were given after surgery. Continue post-op medications as prescribed.

## 2022-12-21 ENCOUNTER — OFFICE VISIT (OUTPATIENT)
Dept: ENT CLINIC | Age: 62
End: 2022-12-21

## 2022-12-21 VITALS
TEMPERATURE: 97.5 F | BODY MASS INDEX: 29.92 KG/M2 | HEIGHT: 69 IN | SYSTOLIC BLOOD PRESSURE: 149 MMHG | WEIGHT: 202 LBS | HEART RATE: 78 BPM | DIASTOLIC BLOOD PRESSURE: 98 MMHG

## 2022-12-21 DIAGNOSIS — Z09 POSTOP CHECK: Primary | ICD-10-CM

## 2022-12-21 PROCEDURE — 99024 POSTOP FOLLOW-UP VISIT: CPT | Performed by: OTOLARYNGOLOGY

## 2023-01-02 NOTE — PROGRESS NOTES
POST-OP NOTE    Chief Complaint   Patient presents with    Other         HISTORY:     POD #12, Post septal reconstruction and inferior turbinate reduction on 12/09/2022. Doing well, with no current complaints. EXAMINATION:        Vitals:    12/21/22 1434   BP: (!) 149/98   Pulse: 78   Temp: 97.5 °F (36.4 °C)      WDWN, awake and alert, with no evidence of distress. Nose:. Septum was midline. The inferior turbinates were lateralized well. Nasal airway was patent bilaterally, with normal airflow. Tsering Rosana was seen today for other. Diagnoses and all orders for this visit:    Postop check         RECOMMENDATIONS / PLAN:   Dolores Fu was advised to follow the post-op instructions in the after visit summary given after surgery. Dolores Fu was advised to continue post-op medications as prescribed. Return in about 1 month (around 1/23/2023) for post op check.

## 2023-01-23 ENCOUNTER — OFFICE VISIT (OUTPATIENT)
Dept: ENT CLINIC | Age: 63
End: 2023-01-23

## 2023-01-23 VITALS
SYSTOLIC BLOOD PRESSURE: 124 MMHG | HEIGHT: 69 IN | BODY MASS INDEX: 29.33 KG/M2 | HEART RATE: 76 BPM | WEIGHT: 198 LBS | TEMPERATURE: 98.5 F | DIASTOLIC BLOOD PRESSURE: 86 MMHG

## 2023-01-23 DIAGNOSIS — Z09 POSTOP CHECK: Primary | ICD-10-CM

## 2023-01-23 PROCEDURE — 99024 POSTOP FOLLOW-UP VISIT: CPT | Performed by: OTOLARYNGOLOGY

## 2023-01-23 NOTE — PROGRESS NOTES
POST-OP NOTE    Chief Complaint   Patient presents with    Post-Op Check       HISTORY:     POD #45, Post septal reconstruction and inferior turbinate reduction on 12/09/2022. Stated that he is breathing much better through his nose than before surgery and is happy that he had the surgery done. He said that he is doing well with no current complaints. EXAMINATION:        Vitals:    01/23/23 1257   BP: 124/86   Pulse: 76   Temp: 98.5 °F (36.9 °C)      WDWN, awake and alert, with no evidence of distress. Nose: Nasal septum appears to be midline with no significant lateral deviations or spurring. The inferior turbinates are lateralized and reduced. The bilateral nasal airways were widely patent with normal airflow. Opal Ortiz was seen today for post-op check. Diagnoses and all orders for this visit:    Postop check  Comments:  Appears to be doing well with great improvement in right nasal airway and no evidence of postoperative complication. RECOMMENDATIONS / PLAN:   Return for any further ENT or sinus problems or symptoms.

## 2024-07-15 SDOH — HEALTH STABILITY: PHYSICAL HEALTH: ON AVERAGE, HOW MANY DAYS PER WEEK DO YOU ENGAGE IN MODERATE TO STRENUOUS EXERCISE (LIKE A BRISK WALK)?: 4 DAYS

## 2024-07-15 NOTE — PROGRESS NOTES
[facility] New Patient Additional Questions       Question 7/15/2024 11:50 AM EDT - Filed by Patient    Please list any providers you have seen in the last 12 months and for what reason       In the past 12 Months have you had any of the following symptoms?     Headaches No    Fainting or passing out No    Sudden loss of vision, strength, or inability to speak No    Hearing loss or ringing in ear(s) No    Nosebleeds No    Coughing for more than 2-4 weeks No    Coughing up blood No    Shortness of breath or wheezing No    Swelling of feet or ankles No    Chest pain, chest pressure or heaviness No    Irregular heartbeat or suden fast heartbeat No    Difficulty swallowing or food \"sticking\" No    Frequent heartburn or indigestion No    Abdominal pain No    Frequent constipation No    Frequent diarrhea No    Rectal bleeding/black stools No    Urinating more than twice per night No    Pain in joints or bones No    Unusual bruising or bleeding No    Seizures, convulsions No    Change in wart, mole or skin growth No    Difficulty sleeping No    Tearfulness No    Difficulty concentrating No    Weight loss more than 5-10 pounds No    Irregular menstrual bleeding No    Menstrual bleeding after menopause No    Breast lumps/discharge from nipple(s) No    On average, how many days per week do you engage in moderate to strenuous exercise (like a brisk walk)? 4 days    On average, how many minutes do you engage in exercise at this level?       Please upload an image of your Drivers License.  [Media Unavailable] Upload from 7/15/2024 11:48 AM EDT      [Media Unavailable] Upload from 7/15/2024 11:49 AM EDT    Which option best describes your E-cigarette/Vaping usage?       Approximately when did you start using E-cigarettes/vaping?       Have you ever been exposed to second-hand vapor from someone you are regularly in contact with? No    Approximately when did you quit using E-cigarettes/vaping?       Have you ever

## 2024-07-16 ENCOUNTER — TELEPHONE (OUTPATIENT)
Dept: ORTHOPEDIC SURGERY | Age: 64
End: 2024-07-16

## 2024-07-16 ENCOUNTER — OFFICE VISIT (OUTPATIENT)
Dept: ORTHOPEDIC SURGERY | Age: 64
End: 2024-07-16
Payer: COMMERCIAL

## 2024-07-16 VITALS — RESPIRATION RATE: 16 BRPM | WEIGHT: 188 LBS | HEIGHT: 69 IN | BODY MASS INDEX: 27.85 KG/M2

## 2024-07-16 DIAGNOSIS — M25.561 CHRONIC PAIN OF RIGHT KNEE: Primary | ICD-10-CM

## 2024-07-16 DIAGNOSIS — G89.29 CHRONIC PAIN OF RIGHT KNEE: Primary | ICD-10-CM

## 2024-07-16 PROCEDURE — 99203 OFFICE O/P NEW LOW 30 MIN: CPT | Performed by: ORTHOPAEDIC SURGERY

## 2024-07-16 RX ORDER — AMLODIPINE BESYLATE 10 MG/1
TABLET ORAL
COMMUNITY
Start: 2024-05-25 | End: 2024-07-16

## 2024-07-16 NOTE — PROGRESS NOTES
CHIEF COMPLAINT: Right knee pain.    DATE OF INJURY: 10/2023    History:   Rigoberto Kennedy is a 63 y.o. male self-referred for evaluation and treatment of right knee pain / injury. The patient complains of right knee pain.   This is evaluated as a personal injury.   The pain began 9 months ago.  There was a history of injury.  He was Trail running and stepping on a tree root and twisted his knee.  He had immediate pain.  Rates pain 8/10.   The pain is located medial.  Symptoms are worse with walking for a long time, running, stairs, doing flutter kicks.  He has pain when squatting and performing dead lifts.  The knee has not given out or felt unstable. The patient can bend and straighten the knee fully. There was swelling in the knee.   There was not catching / locking of the knee.   The patient has not had PT.  He has performed home exercise program for his quads, hamstrings, and glutes in the [facility] gym with indirect instructions from the physical therapist..  The patient has not had an injection.   The patient has not taken NSAIDs. The patient has tried ice, with relief.   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      Past Medical History:   Diagnosis Date    Hypertension        Past Surgical History:   Procedure Laterality Date    SEPTOPLASTY N/A 12/9/2022    SEPTAL RECONSTRUCTION; INFERIOR TURBINATE REDUCTION performed by Dino Ramos MD at [facility] OR       Family History   Problem Relation Age of Onset    High Blood Pressure Father        Social History     Socioeconomic History    Marital status: Unknown   Tobacco Use    Smoking status: Never    Smokeless tobacco: Never   Vaping Use    Vaping Use: Never used   Substance and Sexual Activity    Alcohol use: Never    Drug use: Never     Social Determinants of Health     Physical Activity: Unknown (7/15/2024)    Exercise Vital Sign     Days of Exercise per Week: 4 days       Current Outpatient Medications   Medication Sig Dispense

## 2024-07-16 NOTE — TELEPHONE ENCOUNTER
S/W Rigoberto Kennedy  regarding MRI RT KNEE approval and authorization being valid until 9/13/2024.  Patient was instructed that their MRI has been scheduled  at [facility]:  7/18/2024 at 6:30pm with an arrival time of 6:15pm.     The patient was instructed to contact the facility if there is a need to reschedule at [phone].    A follow up appointment has been scheduled for 7/23/2024 9:00am at the  office.     The patient was provided contact information should the need arise to reschedule any of the appointments.

## 2024-07-23 ENCOUNTER — OFFICE VISIT (OUTPATIENT)
Dept: ORTHOPEDIC SURGERY | Age: 64
End: 2024-07-23
Payer: COMMERCIAL

## 2024-07-23 VITALS — BODY MASS INDEX: 27.11 KG/M2 | HEIGHT: 69 IN | WEIGHT: 183 LBS | RESPIRATION RATE: 16 BRPM

## 2024-07-23 DIAGNOSIS — S83.241A OTHER TEAR OF MEDIAL MENISCUS, CURRENT INJURY, RIGHT KNEE, INITIAL ENCOUNTER: Primary | ICD-10-CM

## 2024-07-23 PROCEDURE — 99214 OFFICE O/P EST MOD 30 MIN: CPT | Performed by: STUDENT IN AN ORGANIZED HEALTH CARE EDUCATION/TRAINING PROGRAM

## 2024-07-23 NOTE — PROGRESS NOTES
CHIEF COMPLAINT: Right knee pain.    DATE OF INJURY: 10/2023    History:   Rigoberto Kennedy is a 63 y.o. male self-referred for evaluation and treatment of right knee pain / injury. The patient complains of right knee pain.   This is evaluated as a personal injury.   The pain began 9 months ago.  There was a history of injury.  He was Trail running and stepping on a tree root and twisted his knee.  He had immediate pain.  Rates pain 8/10.   The pain is located medial.  Symptoms are worse with walking for a long time, running, stairs, doing flutter kicks.  He has pain when squatting and performing dead lifts.  The knee has not given out or felt unstable. The patient can bend and straighten the knee fully. There was swelling in the knee.   There was not catching / locking of the knee.   The patient has not had PT.  He has performed home exercise program for his quads, hamstrings, and glutes in the [facility] gym with indirect instructions from the physical therapist..  The patient has not had an injection.   The patient has not taken NSAIDs. The patient has tried ice, with relief.   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    Interval history: He rates pain 8/10.  Pain is still located at the medial joint line.  He has not been able to run or swim.        Past Medical History:   Diagnosis Date    Hypertension        Past Surgical History:   Procedure Laterality Date    SEPTOPLASTY N/A 12/9/2022    SEPTAL RECONSTRUCTION; INFERIOR TURBINATE REDUCTION performed by Dino Ramos MD at [facility] OR       Family History   Problem Relation Age of Onset    High Blood Pressure Father        Social History     Socioeconomic History    Marital status: Unknown     Spouse name: None    Number of children: None    Years of education: None    Highest education level: None   Tobacco Use    Smoking status: Never    Smokeless tobacco: Never   Substance and Sexual Activity    Alcohol use: Never    Drug use: Never

## 2024-08-06 ENCOUNTER — TELEPHONE (OUTPATIENT)
Dept: ORTHOPEDIC SURGERY | Age: 64
End: 2024-08-06

## (undated) DEVICE — PACK PROCEDURE SURG EXTREMITY MFFOP CUST

## (undated) DEVICE — TOWEL,OR,DSP,ST,BLUE,STD,4/PK,20PK/CS: Brand: MEDLINE

## (undated) DEVICE — NEEDLE SPNL 25GA L2IN BLU SHT NEO LUM PUNC DISP

## (undated) DEVICE — YANKAUER,BULB TIP,W/O VENT,RIGID,STERILE: Brand: MEDLINE

## (undated) DEVICE — SOLUTION IRRIG 500ML 0.9% SOD CHL USP POUR PLAS BTL

## (undated) DEVICE — MASTISOL ADHESIVE LIQ 2/3ML

## (undated) DEVICE — GAUZE,SPONGE,4"X4",8PLY,STRL,LF,10/TRAY: Brand: MEDLINE

## (undated) DEVICE — MEDICINE CUP, GRADUATED, STER: Brand: MEDLINE

## (undated) DEVICE — SUTURE CHROMIC GUT SZ 4-0 L18IN ABSRB BRN L13MM P-3 3/8 CIR 1654G

## (undated) DEVICE — SPONGE,NEURO,0.5"X3",XR,STRL,LF,10/PK: Brand: MEDLINE

## (undated) DEVICE — DRAPE EENT SPLIT

## (undated) DEVICE — GLOVE,SURG,SENSICARE SLT,LF,PF,8: Brand: MEDLINE

## (undated) DEVICE — Device

## (undated) DEVICE — SYSTEM AIRWAY NASAL CLEAR FLOW RELTOK

## (undated) DEVICE — PACKING 440413 10PK DOYLE SLIMLINE: Brand: MEROCEL®

## (undated) DEVICE — SURGICAL SUCTION CONNECTING TUBE WITH MALE CONNECTOR AND SUCTION CLAMP, 2 FT. LONG (.6 M), 5 MM I.D.: Brand: CONMED

## (undated) DEVICE — NEEDLE, QUINCKE, 25GX2.5": Brand: MEDLINE

## (undated) DEVICE — HYPODERMIC SAFETY NEEDLE: Brand: MAGELLAN